# Patient Record
Sex: FEMALE | Race: ASIAN | NOT HISPANIC OR LATINO
[De-identification: names, ages, dates, MRNs, and addresses within clinical notes are randomized per-mention and may not be internally consistent; named-entity substitution may affect disease eponyms.]

---

## 2022-09-26 ENCOUNTER — NON-APPOINTMENT (OUTPATIENT)
Age: 67
End: 2022-09-26

## 2022-09-26 ENCOUNTER — APPOINTMENT (OUTPATIENT)
Dept: ORTHOPEDIC SURGERY | Facility: CLINIC | Age: 67
End: 2022-09-26

## 2022-09-26 VITALS
SYSTOLIC BLOOD PRESSURE: 130 MMHG | WEIGHT: 100 LBS | HEART RATE: 70 BPM | DIASTOLIC BLOOD PRESSURE: 78 MMHG | HEIGHT: 60 IN | BODY MASS INDEX: 19.63 KG/M2

## 2022-09-26 DIAGNOSIS — M54.50 LOW BACK PAIN, UNSPECIFIED: ICD-10-CM

## 2022-09-26 PROBLEM — Z00.00 ENCOUNTER FOR PREVENTIVE HEALTH EXAMINATION: Status: ACTIVE | Noted: 2022-09-26

## 2022-09-26 PROCEDURE — 72110 X-RAY EXAM L-2 SPINE 4/>VWS: CPT

## 2022-09-26 PROCEDURE — 99204 OFFICE O/P NEW MOD 45 MIN: CPT

## 2022-09-26 PROCEDURE — 72170 X-RAY EXAM OF PELVIS: CPT

## 2022-09-26 RX ORDER — MELOXICAM 15 MG/1
15 TABLET ORAL DAILY
Qty: 30 | Refills: 0 | Status: ACTIVE | COMMUNITY
Start: 2022-09-26 | End: 1900-01-01

## 2022-09-26 RX ORDER — PANTOPRAZOLE SODIUM 20 MG/1
20 TABLET, DELAYED RELEASE ORAL
Refills: 0 | Status: ACTIVE | COMMUNITY

## 2022-09-26 NOTE — PHYSICAL EXAM
[de-identified] : Gen: NAD\par Resp: Nonlabored respirations\par Gait: Slow, steady\par \par Spine:\par CN I-XII grossly intact\par Skin in tact\par Paraspinal tenderness diffuse in lower lumbar spine\par \par b/l UE - 5/5 strength D, B, T, EPL, FDP, IO\par SILT C5-T1\par (-) jeff camilo\par 2+ rad bcr\par \par b/l LE - 5/5 strength IP Q EHL TA GS\par SILT L3-S1\par 2+ dp bcr\par (-) babinski, no clonus [de-identified] : 4 view spine flexion/extension xray - No frx/dislocation, mild DJD at L4-S1 - no significant spondylolisthesis, maintained lumbar lordosis\par AP Pelvis xray - some SI sclerosis, b/l hips with little-no OA, no frx/dislocation

## 2022-09-26 NOTE — DISCUSSION/SUMMARY
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  Strength/sensation full.\par We extensively discussed treatment options including but not limited to observation, activity modification, PT, NSAIDs, injection and surgery and will proceed with PT/NSAIDs at this time.\par -PT referral\par -Meloxicam x30d\par -Given the demanding physical nature of her job I advised her to rest from work for 10d\par -Re-evaluate in 2 weeks\par \par All her questions were answered and she was satisfied with the treatment plan.

## 2022-09-26 NOTE — HISTORY OF PRESENT ILLNESS
Fax went through.  Waiting call back. 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  Pt has previously experienced episodic back pain which resolves within 1-2d.\par On this occasion, she was holding her  grandchild and felt cramping - it has remained the same for over a week despite NSAID/rest.\par No numbness, paresthesias, weakness, bowel/bladder incontinence.

## 2022-12-05 ENCOUNTER — TRANSCRIPTION ENCOUNTER (OUTPATIENT)
Age: 67
End: 2022-12-05